# Patient Record
Sex: MALE | Race: WHITE | HISPANIC OR LATINO | Employment: PART TIME | URBAN - METROPOLITAN AREA
[De-identification: names, ages, dates, MRNs, and addresses within clinical notes are randomized per-mention and may not be internally consistent; named-entity substitution may affect disease eponyms.]

---

## 2023-03-09 PROBLEM — Z79.4 LONG-TERM INSULIN USE (MULTI): Status: ACTIVE | Noted: 2023-03-09

## 2023-03-09 PROBLEM — F41.8 DEPRESSION WITH ANXIETY: Status: ACTIVE | Noted: 2023-03-09

## 2023-03-09 PROBLEM — K86.2 PANCREATIC CYST (HHS-HCC): Status: ACTIVE | Noted: 2023-03-09

## 2023-03-09 PROBLEM — I10 HTN (HYPERTENSION): Status: ACTIVE | Noted: 2023-03-09

## 2023-03-09 PROBLEM — F41.9 ANXIETY: Status: ACTIVE | Noted: 2023-03-09

## 2023-03-09 PROBLEM — E11.9 TYPE 2 DIABETES MELLITUS (MULTI): Status: ACTIVE | Noted: 2023-03-09

## 2023-03-09 PROBLEM — K76.0 FATTY LIVER: Status: ACTIVE | Noted: 2023-03-09

## 2023-03-09 PROBLEM — R73.01 ELEVATED FASTING GLUCOSE: Status: ACTIVE | Noted: 2023-03-09

## 2023-03-09 PROBLEM — K52.9 CHRONIC DIARRHEA: Status: ACTIVE | Noted: 2023-03-09

## 2023-03-09 PROBLEM — L85.3 DRY SKIN DERMATITIS: Status: ACTIVE | Noted: 2023-03-09

## 2023-03-09 PROBLEM — K21.9 GERD (GASTROESOPHAGEAL REFLUX DISEASE): Status: ACTIVE | Noted: 2023-03-09

## 2023-03-09 PROBLEM — F90.1 ADHD, IMPULSIVE TYPE: Status: ACTIVE | Noted: 2023-03-09

## 2023-03-09 PROBLEM — F84.0 AUTISTIC SPECTRUM DISORDER (HHS-HCC): Status: ACTIVE | Noted: 2023-03-09

## 2023-03-09 PROBLEM — E66.9 OBESITY: Status: ACTIVE | Noted: 2023-03-09

## 2023-03-09 PROBLEM — G47.33 OSA (OBSTRUCTIVE SLEEP APNEA): Status: ACTIVE | Noted: 2023-03-09

## 2023-03-09 PROBLEM — E78.1 HYPERTRIGLYCERIDEMIA: Status: ACTIVE | Noted: 2023-03-09

## 2023-03-09 PROBLEM — J30.9 AR (ALLERGIC RHINITIS): Status: ACTIVE | Noted: 2023-03-09

## 2023-03-09 RX ORDER — FENOFIBRATE 160 MG/1
160 TABLET ORAL DAILY
COMMUNITY
End: 2023-04-28 | Stop reason: SDUPTHER

## 2023-03-09 RX ORDER — FLASH GLUCOSE SCANNING READER
EACH MISCELLANEOUS
COMMUNITY
End: 2024-02-08 | Stop reason: WASHOUT

## 2023-03-09 RX ORDER — BUPROPION HYDROCHLORIDE 300 MG/1
1 TABLET ORAL DAILY
COMMUNITY
Start: 2020-09-28 | End: 2023-08-29 | Stop reason: SDUPTHER

## 2023-03-09 RX ORDER — FLASH GLUCOSE SENSOR
KIT MISCELLANEOUS
COMMUNITY
End: 2024-02-08 | Stop reason: WASHOUT

## 2023-03-09 RX ORDER — ESCITALOPRAM OXALATE 20 MG/1
20 TABLET ORAL DAILY
COMMUNITY
Start: 2018-02-20 | End: 2023-04-28 | Stop reason: SDUPTHER

## 2023-03-09 RX ORDER — METOPROLOL TARTRATE 25 MG/1
25 TABLET, FILM COATED ORAL DAILY
COMMUNITY
End: 2023-04-28 | Stop reason: SDUPTHER

## 2023-03-09 RX ORDER — LANCETS
EACH MISCELLANEOUS
COMMUNITY
End: 2024-02-08 | Stop reason: WASHOUT

## 2023-03-09 RX ORDER — AMMONIUM LACTATE 12 G/100G
LOTION TOPICAL 2 TIMES DAILY
COMMUNITY
Start: 2022-09-06

## 2023-03-09 RX ORDER — METFORMIN HYDROCHLORIDE 500 MG/1
1 TABLET, EXTENDED RELEASE ORAL 2 TIMES DAILY
COMMUNITY
Start: 2021-08-16 | End: 2024-02-15 | Stop reason: WASHOUT

## 2023-03-09 RX ORDER — GLIMEPIRIDE 1 MG/1
1 TABLET ORAL 2 TIMES DAILY
COMMUNITY
Start: 2021-03-16 | End: 2024-02-08 | Stop reason: WASHOUT

## 2023-03-09 RX ORDER — BLOOD SUGAR DIAGNOSTIC
STRIP MISCELLANEOUS 4 TIMES DAILY
COMMUNITY
Start: 2021-02-18 | End: 2024-02-15 | Stop reason: WASHOUT

## 2023-03-09 RX ORDER — ATORVASTATIN CALCIUM 80 MG/1
1 TABLET, FILM COATED ORAL NIGHTLY
COMMUNITY
Start: 2021-01-29 | End: 2024-02-08 | Stop reason: WASHOUT

## 2023-03-20 ENCOUNTER — APPOINTMENT (OUTPATIENT)
Dept: PRIMARY CARE | Facility: CLINIC | Age: 28
End: 2023-03-20
Payer: MEDICAID

## 2023-04-28 DIAGNOSIS — E78.1 HYPERTRIGLYCERIDEMIA: ICD-10-CM

## 2023-04-28 DIAGNOSIS — F41.9 ANXIETY: ICD-10-CM

## 2023-04-28 DIAGNOSIS — I10 HYPERTENSION, UNSPECIFIED TYPE: Primary | ICD-10-CM

## 2023-04-28 RX ORDER — METOPROLOL TARTRATE 25 MG/1
25 TABLET, FILM COATED ORAL DAILY
Qty: 90 TABLET | Refills: 0 | Status: SHIPPED | OUTPATIENT
Start: 2023-04-28 | End: 2024-02-08

## 2023-04-28 RX ORDER — FENOFIBRATE 160 MG/1
160 TABLET ORAL DAILY
Qty: 90 TABLET | Refills: 0 | Status: SHIPPED | OUTPATIENT
Start: 2023-04-28 | End: 2023-08-29 | Stop reason: SDUPTHER

## 2023-04-28 RX ORDER — ESCITALOPRAM OXALATE 20 MG/1
20 TABLET ORAL DAILY
Qty: 90 TABLET | Refills: 0 | Status: SHIPPED | OUTPATIENT
Start: 2023-04-28 | End: 2024-02-08

## 2023-08-08 LAB
ALBUMIN (MG/L) IN URINE: 45.9 MG/L
ALBUMIN/CREATININE (UG/MG) IN URINE: 24.2 UG/MG CRT (ref 0–30)
ANION GAP IN SER/PLAS: 13 MMOL/L (ref 10–20)
CALCIUM (MG/DL) IN SER/PLAS: 9.7 MG/DL (ref 8.6–10.3)
CARBON DIOXIDE, TOTAL (MMOL/L) IN SER/PLAS: 26 MMOL/L (ref 21–32)
CHLORIDE (MMOL/L) IN SER/PLAS: 101 MMOL/L (ref 98–107)
CHOLESTEROL (MG/DL) IN SER/PLAS: 194 MG/DL (ref 0–199)
CHOLESTEROL IN HDL (MG/DL) IN SER/PLAS: 22.5 MG/DL
CHOLESTEROL/HDL RATIO: 8.6
CREATININE (MG/DL) IN SER/PLAS: 0.62 MG/DL (ref 0.5–1.3)
CREATININE (MG/DL) IN URINE: 190 MG/DL (ref 20–370)
ESTIMATED AVERAGE GLUCOSE FOR HBA1C: 137 MG/DL
GFR MALE: >90 ML/MIN/1.73M2
GLUCOSE (MG/DL) IN SER/PLAS: 130 MG/DL (ref 74–99)
HEMOGLOBIN A1C/HEMOGLOBIN TOTAL IN BLOOD: 6.4 %
LDL: ABNORMAL MG/DL (ref 0–99)
POTASSIUM (MMOL/L) IN SER/PLAS: 4.2 MMOL/L (ref 3.5–5.3)
SODIUM (MMOL/L) IN SER/PLAS: 136 MMOL/L (ref 136–145)
TRIGLYCERIDE (MG/DL) IN SER/PLAS: 1356 MG/DL (ref 0–149)
UREA NITROGEN (MG/DL) IN SER/PLAS: 9 MG/DL (ref 6–23)
VLDL: ABNORMAL MG/DL (ref 0–40)

## 2023-08-29 DIAGNOSIS — F41.9 ANXIETY: Primary | ICD-10-CM

## 2023-08-29 DIAGNOSIS — E78.1 HYPERTRIGLYCERIDEMIA: ICD-10-CM

## 2023-08-30 RX ORDER — BUPROPION HYDROCHLORIDE 300 MG/1
300 TABLET ORAL DAILY
Qty: 90 TABLET | Refills: 0 | Status: SHIPPED | OUTPATIENT
Start: 2023-08-30 | End: 2023-08-30

## 2023-08-30 RX ORDER — FENOFIBRATE 160 MG/1
160 TABLET ORAL DAILY
Qty: 90 TABLET | Refills: 0 | Status: SHIPPED | OUTPATIENT
Start: 2023-08-30 | End: 2023-08-30

## 2023-10-12 NOTE — PATIENT INSTRUCTIONS
[facility] Sleep Medicine  POR [address]  [facility]  [address]  [facility] [address]       NAME: Vineet Macario   DATE: 10/17/23    Your Sleep Provider Today: Teo Sharp APRN-CNP  Your Primary Care Physician: Naren Mari DO       DIAGNOSIS:   Sleep disorder breathing    Dear Vineet ALECIA Sorto,    Great to meet you today.  Thank you for visiting  Sleep Medicine Clinic !     1. We will proceed with a HOME sleep study. The lab will call you and schedule it for you.     2. Please do not drive when you are sleepy and  start practicing the sleep hygiene  as discussed in clinic today.     3.  We are going to do blood work to investigate your restless legs syndrome. Please fast 8 hours prior blood draw.    4. FOR QUESTIONS AND CONCERNS:   a) : To schedule, cancel, or reschedule SLEEP STUDY appointments, please call [phone]  b) : Please call my office with issues or questions: [phone] ([place]); [phone] ([facility])     If you have a CPAP or BiPAP machine at home, please bring the unit and all accessories including the power cord to your appointments unless I tell you otherwise. Please have knowledge of the DME company you worked with to receive your PAP device. If you have copies of any previous sleep testing completed outside of , please bring with you to clinic as well. This information will make our visits more productive.      If you are new to CPAP or BiPAP, please note the minimum usage insurance requires to continuing coverage for the equipment as noted by your DME company. Please discuss equipment issues (PAP unit, mask fit, humidification, etc.) with your DME company first.       In the event that you are running more than 10 minutes late to your appointment, I will kindly ask you to reschedule. Thanks.    RTC  2-3 weeks after sleep study        PATIENT EDUCATION     Obstructive Sleep Apnea (ELAINA) is a sleep disorder  where your upper airway muscles relax during sleep and the airway intermittently and repetitively narrows and collapses leading to partially blocked airway (hypopnea) or completely blocked airway (apnea) which, in turn, can disrupt breathing in sleep, lower oxygen levels while you sleep and cause night time wakings. Because both apnea and hypopnea may cause higher carbon dioxide or low oxygen levels, untreated ELAINA can lead to heart arrhythmia, elevation of blood pressure, and make it harder for the body to consolidate memory and facilitate metabolism (leading to higher blood sugars at night). Frequent partial arousals occur during sleep resulting in sleep deprivation and daytime sleepiness. ELAINA is associated with an increased risk of cardiovascular disease, stroke, hypertension, and insulin resistance. Moreover, untreated ELAINA with excessive daytime sleepiness can increase the risk of motor vehicular accidents.    Some conservative strategies for ELAINA regardless of ELAINA severity are:   Positional therapy - Avoid sleeping on your back.   Healthy diet and regular exercise to optimize weight is highly encouraged.   Avoid alcohol late in the evening and sedative-hypnotics as these substances can make sleep apnea worse.   Improve breathing through the nose with intranasal steroid spray, saline rinse, or antihistamines    Safety: Avoid driving vehicle and operating heavy equipment while sleepy. Drowsy driving may lead to life-threatening motor vehicle accidents. A person driving while sleepy is 5 times more likely to have an accident. If you feel sleepy, pull over and take a short power nap (sleep for less than 30 minutes). Otherwise, ask somebody to drive you.    Treatment options for sleep apnea include weight management, positional therapy, Positive Airway Therapy (PAP) therapy, oral appliance therapy, hypoglossal nerve stimulator (Inspire) and select airway surgeries.    You can also go to the following EDUCATION  WEBSITES for further information:   American Academy of Sleep Medicine http://sleepeducation.org  National Sleep Foundation: https://sleepfoundation.org  American Sleep Apnea Association: https://www.sleepapnea.org (for patients with sleep apnea)  Narcolepsy Network: https://www.narcolepsynetwork.org (for patients with narcolepsy)  [employer]: https://www.Chinese Onlinecolepsy.org (for patients with narcolepsy)  Hypersomnia Foundation: https://www.hypersomniafoundation.org (for patients with idiopathic hypersomnia)  RLS foundation: https://www.rls.org (for patients with restless leg syndrome)      IMPORTANT INFORMATION     Call 911 for medical emergencies.  Our offices are generally open from Monday-Friday, 9 am - 5 pm.  If you need to get in touch with me, you may either call me and my team(number is below) or you can use [employer].  If a referral for a test, for CPAP, or for another specialist was made, and you have not heard about scheduling this within a week, please call scheduling at [phone] ([phone]).  If you are unable to make your appointment for clinic or an overnight study, kindly call the office at least 48 hours in advance to cancel and reschedule.  If you are on CPAP, please bring your device's card or the device to each clinic appointment.   There are no supporting services by either the sleep doctors or their staff on weekends and Holidays, or after 5 PM on weekdays.   If you have been asked to come to a sleep study, make sure you bring toiletries, a comfy pillow, and any nighttime medications that you may regularly take. Also be sure to eat dinner before you arrive. We generally do not provide meals.      PRESCRIPTIONS     We require 7 days advanced notice for prescription refills. If we do not receive the request in this time, we cannot guarantee that your medication will be refilled in time.      IMPORTANT PHONE NUMBERS     Sleep Medicine Clinic Fax: [phone]  Appointments (for Pediatric  Sleep Clinic): [phone] - option 1  Appointments (for Adult Sleep Clinic): [phone] - option 2  Appointments (For Sleep Studies): [phone] - option 3  Behavioral Sleep Medicine: [phone]  Sleep Surgery: [phone]  ENT (Otolaryngology): [phone]  Headache Clinic (Neurology): [phone]  Neurology: [phone]  Psychiatry: [phone]  Pulmonary Function Testing (PFT) Center: [phone]  Pulmonary Medicine: [phone]  [employer] (DME): [phone]  [employer] (DME): [phone]  [facility] (DME): [phone]      OUR SLEEP TESTING LOCATIONS     Our team will contact you to schedule your sleep study, however, you can contact us as follow:  Main Phone Line (scheduling only): [phone]), option 3  Adult and Pediatric Locations  [facility] (6 years and older): [facility] - 4th floor ([facility]) After hours line: [phone]  [facility] (Main campus: All ages): [facility], 6th floor. After hours line: [phone]   [place] (5 years and older; younger considered on case-by-case basis): [address]; [address], [address]. Scheduling  After hours line: [phone]   [place] (6 years and older): [address]; [address]; [address]   [place] (6 years and older): [address], [address]  After hours line: [phone]   [place] (13 years and older) in [place]: [address], 2nd floor  After hours line: [phone]   [place] (13 year and older): [address]  After hours line: [phone]     Adult Only Locations:   [place] (18 years and older): [address] [facility], 2nd [address]  [facility] (18 years and older): [address] [facility]; 4th floor  After hours line: [phone]  [facility] (18 years and older) at [place]: [address] [facility]  After hours line: [phone]  "     CONTACTING YOUR SLEEP MEDICINE PROVIDER     Send a message directly to your provider through \"My Chart\", which is the email service through your  Records Account: [email or web address]   Call [phone] and leave a message. One of the administrative assistants will forward the message to your sleep medicine provider through \"My Chart\" and/or email.     Your sleep medicine provider for this visit was: NEAL Dhaliwal       "

## 2023-10-12 NOTE — PROGRESS NOTES
Patient: Vineet Sorto    [id number]  : 1995 -- AGE 28 y.o.    Provider: NEAL Dhaliwal     Location [facility]   Service Date: 10/17/23            [facility]  New Visit Note        HISTORY OF PRESENT ILLNESS     The patient's referring provider is: Kimberly Candelario MD     HISTORY OF PRESENT ILLNESS     Vineet Sorto is a 28 y.o. male who presents to a [facility] to establish care for his sleep disorder breathing. 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    The patient has a medical history of obesity, allergic rhinitis, hypertension, anxiety, depression, and sleep disorder breathing.    Current History    On today's visit, the patient reports having snoring, unfreshed sleep, and excessive daytime sleepiness at least 5-7 years. He did not have sleep study before.    Sleep Schedule: He goes to bed at 2-3 AM and his sleep latency is 15-30 minutes. He wakes by  11PM- MN. He has 2-3 awakenings for nocturia. He gets about 6-10 hours of sleep per night.      RLS Followup:   Meets 4/4 criteria, will do fasting blood work.      Daytime Symptoms    Patient report some daytime symptoms including: DAYTIME SYMPTOMS: excessive daytime sleepiness      Naps:   Yes. Naps ARE/ARENOT: are not refreshing.  Fatigue: moderate    ESS: 17  JORGE A 23        REVIEW OF SYSTEMS     REVIEW OF SYSTEMS: All other systems have been reviewed and are negative.     ALLERGIES AND MEDICATIONS   Active Problems, Allergy List, Medication List, and PMH/PSH/FH/Social Hx have been reviewed and reconciled in chart. No significant changes unless documented in the pertinent chart section. Updates made when necessary.      PHYSICAL EXAM     VITAL SIGNS:   Vitals:    10/17/23 1054   BP: 135/81   Pulse: 95   Resp: 16   Temp: 36.6 °C (97.9 °F)      PREVIOUS WEIGHTS:  Wt Readings from Last 3 Encounters:   23 (!) 161 kg (355 lb)  "  01/09/23 (!) 160 kg (352 lb)   10/19/22 (!) 160 kg (352 lb)       Physical Exam  PHYSICAL EXAM: GENERAL: alert pleasant and cooperative no acute distress  MODIFIED MALLAMPATI SCORE: III (soft and hard palate and base of uvula visible)  CHEST EXAM: breath sounds are clear to auscultation bilaterally without rales, rhonchi, or wheezes  CARDIAC EXAM: Heart sounds are normal.  Regular rate and rhythm without murmur, rubs, or gallop      RESULTS/DATA     No results found for: \"IRON\", \"TRANSFERRIN\", \"IRONSAT\", \"TIBC\", \"FERRITIN\"      ASSESSMENT/PLAN     Mr. Sorto is a 28 y.o. male and  has a past medical history of Other conditions influencing health status. He returns in followup to the [facility] for his sleep disorder breathing.    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 STOP-BANG:  Vineet Sorto has not yet had a sleep study.  -Instruct patient to complete home sleep study.  -HSAT is reasonable as patient likely has ELAINA based on history and exam and does not have any of the following comorbidities: CHF, neuromuscular weakness, hypoventilation, or significant COPD.  -We consider treatment as indicated when testing is complete.      # CHRONIC MAINTAIN INSOMNIA  -likely due to poor sleep hygiene, irregular sleep schedule, depression, anxiety, untreated sleep apnea, nocturia, RLS, and delayed sleep phase syndrome.  -JORGE A: 23  -Expose himself to bright light or daylight upon waking. "   -Avoid caffeine 8 hours prior to sleeping.      #  RESTLESS LEG SYNDROME:   -Last ferritin was not on file. We will check a ferritin level today and start OTC iron replacement to ferritin of >75 as indicated.  -Meet 4/4 RLS criteria.    # DEPRESSION and ANXIETY: Vineet Gudino taking bupropion 300 mg and escitalopram 20 mg to control well.  -Denies HI/SI     # HYPERTENSION:  -Blood pressure was 135/81 today.  -Denies headache, palpitation, and syncope in the clinic.  -Follows with PCP/ Cardiology     # OBESITY with a BMI of 44.94. Vineet ALSTON Eberwojciech most recent Bicarbonate was 26 in Aug,2023.         RTC 2-3 weeks after sleep study

## 2023-10-17 ENCOUNTER — OFFICE VISIT (OUTPATIENT)
Dept: SLEEP MEDICINE | Facility: CLINIC | Age: 28
End: 2023-10-17
Payer: MEDICAID

## 2023-10-17 VITALS
TEMPERATURE: 97.9 F | BODY MASS INDEX: 40.43 KG/M2 | DIASTOLIC BLOOD PRESSURE: 81 MMHG | WEIGHT: 315 LBS | RESPIRATION RATE: 16 BRPM | SYSTOLIC BLOOD PRESSURE: 135 MMHG | HEIGHT: 74 IN | HEART RATE: 95 BPM

## 2023-10-17 DIAGNOSIS — G47.30 SLEEP-RELATED BREATHING DISORDER: Primary | ICD-10-CM

## 2023-10-17 DIAGNOSIS — I10 HYPERTENSION, UNSPECIFIED TYPE: ICD-10-CM

## 2023-10-17 DIAGNOSIS — F41.8 DEPRESSION WITH ANXIETY: ICD-10-CM

## 2023-10-17 DIAGNOSIS — G47.00 INSOMNIA, UNSPECIFIED TYPE: ICD-10-CM

## 2023-10-17 DIAGNOSIS — E83.10 DISORDER OF IRON METABOLISM: ICD-10-CM

## 2023-10-17 PROCEDURE — 3044F HG A1C LEVEL LT 7.0%: CPT

## 2023-10-17 PROCEDURE — 3075F SYST BP GE 130 - 139MM HG: CPT

## 2023-10-17 PROCEDURE — 1036F TOBACCO NON-USER: CPT

## 2023-10-17 PROCEDURE — 99214 OFFICE O/P EST MOD 30 MIN: CPT

## 2023-10-17 PROCEDURE — 99204 OFFICE O/P NEW MOD 45 MIN: CPT

## 2023-10-17 PROCEDURE — 3079F DIAST BP 80-89 MM HG: CPT

## 2023-10-17 ASSESSMENT — SLEEP AND FATIGUE QUESTIONNAIRES
HOW LIKELY ARE YOU TO NOD OFF OR FALL ASLEEP WHEN YOU ARE A PASSENGER IN A CAR FOR AN HOUR WITHOUT A BREAK: MODERATE CHANCE OF DOZING
HOW LIKELY ARE YOU TO NOD OFF OR FALL ASLEEP WHILE SITTING QUIETLY AFTER LUNCH WITHOUT ALCOHOL: MODERATE CHANCE OF DOZING
HOW LIKELY ARE YOU TO NOD OFF OR FALL ASLEEP IN A CAR, WHILE STOPPED FOR A FEW MINUTES IN TRAFFIC: SLIGHT CHANCE OF DOZING
HOW LIKELY ARE YOU TO NOD OFF OR FALL ASLEEP WHILE WATCHING TV: HIGH CHANCE OF DOZING
ESS TOTAL SCORE: 17
HOW LIKELY ARE YOU TO NOD OFF OR FALL ASLEEP WHILE LYING DOWN TO REST IN THE AFTERNOON WHEN CIRCUMSTANCES PERMIT: HIGH CHANCE OF DOZING
ESS TOTAL SCORE: 17
SITING INACTIVE IN A PUBLIC PLACE LIKE A CLASS ROOM OR A MOVIE THEATER: MODERATE CHANCE OF DOZING
HOW LIKELY ARE YOU TO NOD OFF OR FALL ASLEEP WHILE SITTING AND TALKING TO SOMEONE: SLIGHT CHANCE OF DOZING
HOW LIKELY ARE YOU TO NOD OFF OR FALL ASLEEP WHILE SITTING AND READING: HIGH CHANCE OF DOZING

## 2023-11-24 ENCOUNTER — PHARMACY VISIT (OUTPATIENT)
Dept: PHARMACY | Facility: CLINIC | Age: 28
End: 2023-11-24
Payer: MEDICAID

## 2023-11-24 PROCEDURE — RXMED WILLOW AMBULATORY MEDICATION CHARGE

## 2023-12-26 ENCOUNTER — CLINICAL SUPPORT (OUTPATIENT)
Dept: SLEEP MEDICINE | Facility: HOSPITAL | Age: 28
End: 2023-12-26
Payer: MEDICAID

## 2023-12-26 DIAGNOSIS — G47.30 SLEEP-RELATED BREATHING DISORDER: ICD-10-CM

## 2023-12-26 PROCEDURE — 95806 SLEEP STUDY UNATT&RESP EFFT: CPT | Performed by: INTERNAL MEDICINE

## 2024-02-06 ENCOUNTER — PHARMACY VISIT (OUTPATIENT)
Dept: PHARMACY | Facility: CLINIC | Age: 29
End: 2024-02-06
Payer: MEDICAID

## 2024-02-06 PROCEDURE — RXMED WILLOW AMBULATORY MEDICATION CHARGE

## 2024-02-08 ENCOUNTER — LAB (OUTPATIENT)
Dept: LAB | Facility: LAB | Age: 29
End: 2024-02-08
Payer: MEDICAID

## 2024-02-08 ENCOUNTER — OFFICE VISIT (OUTPATIENT)
Dept: ENDOCRINOLOGY | Facility: CLINIC | Age: 29
End: 2024-02-08
Payer: MEDICAID

## 2024-02-08 VITALS
BODY MASS INDEX: 41.75 KG/M2 | HEART RATE: 87 BPM | HEIGHT: 73 IN | SYSTOLIC BLOOD PRESSURE: 132 MMHG | DIASTOLIC BLOOD PRESSURE: 66 MMHG | WEIGHT: 315 LBS

## 2024-02-08 DIAGNOSIS — E78.1 HYPERTRIGLYCERIDEMIA: ICD-10-CM

## 2024-02-08 DIAGNOSIS — E11.9 TYPE 2 DIABETES MELLITUS WITHOUT COMPLICATION, UNSPECIFIED WHETHER LONG TERM INSULIN USE (MULTI): Primary | ICD-10-CM

## 2024-02-08 DIAGNOSIS — E11.9 TYPE 2 DIABETES MELLITUS WITHOUT COMPLICATION, UNSPECIFIED WHETHER LONG TERM INSULIN USE (MULTI): ICD-10-CM

## 2024-02-08 LAB
ALBUMIN SERPL BCP-MCNC: 4.6 G/DL (ref 3.4–5)
ALP SERPL-CCNC: 42 U/L (ref 33–120)
ALT SERPL W P-5'-P-CCNC: 35 U/L (ref 10–52)
ANION GAP SERPL CALC-SCNC: 14 MMOL/L (ref 10–20)
AST SERPL W P-5'-P-CCNC: 19 U/L (ref 9–39)
BILIRUB SERPL-MCNC: 0.9 MG/DL (ref 0–1.2)
BUN SERPL-MCNC: 11 MG/DL (ref 6–23)
CALCIUM SERPL-MCNC: 9.6 MG/DL (ref 8.6–10.3)
CHLORIDE SERPL-SCNC: 103 MMOL/L (ref 98–107)
CHOLEST SERPL-MCNC: 154 MG/DL (ref 0–199)
CHOLESTEROL/HDL RATIO: 6.3
CO2 SERPL-SCNC: 24 MMOL/L (ref 21–32)
CREAT SERPL-MCNC: 0.54 MG/DL (ref 0.5–1.3)
CREAT UR-MCNC: 135.8 MG/DL (ref 20–370)
EGFRCR SERPLBLD CKD-EPI 2021: >90 ML/MIN/1.73M*2
EST. AVERAGE GLUCOSE BLD GHB EST-MCNC: 123 MG/DL
GLUCOSE SERPL-MCNC: 99 MG/DL (ref 74–99)
HBA1C MFR BLD: 5.9 %
HDLC SERPL-MCNC: 24.6 MG/DL
LDLC SERPL CALC-MCNC: ABNORMAL MG/DL
MICROALBUMIN UR-MCNC: 30.4 MG/L
MICROALBUMIN/CREAT UR: 22.4 UG/MG CREAT
NON HDL CHOLESTEROL: 129 MG/DL (ref 0–149)
POTASSIUM SERPL-SCNC: 4 MMOL/L (ref 3.5–5.3)
PROT SERPL-MCNC: 7.7 G/DL (ref 6.4–8.2)
SODIUM SERPL-SCNC: 137 MMOL/L (ref 136–145)
TRIGL SERPL-MCNC: 654 MG/DL (ref 0–149)
VLDL: ABNORMAL

## 2024-02-08 PROCEDURE — 99214 OFFICE O/P EST MOD 30 MIN: CPT | Performed by: INTERNAL MEDICINE

## 2024-02-08 PROCEDURE — 36415 COLL VENOUS BLD VENIPUNCTURE: CPT

## 2024-02-08 PROCEDURE — 3044F HG A1C LEVEL LT 7.0%: CPT | Performed by: INTERNAL MEDICINE

## 2024-02-08 PROCEDURE — 82043 UR ALBUMIN QUANTITATIVE: CPT

## 2024-02-08 PROCEDURE — 3060F POS MICROALBUMINURIA REV: CPT | Performed by: INTERNAL MEDICINE

## 2024-02-08 PROCEDURE — 80061 LIPID PANEL: CPT

## 2024-02-08 PROCEDURE — 1036F TOBACCO NON-USER: CPT | Performed by: INTERNAL MEDICINE

## 2024-02-08 PROCEDURE — 3078F DIAST BP <80 MM HG: CPT | Performed by: INTERNAL MEDICINE

## 2024-02-08 PROCEDURE — 80053 COMPREHEN METABOLIC PANEL: CPT

## 2024-02-08 PROCEDURE — 83036 HEMOGLOBIN GLYCOSYLATED A1C: CPT

## 2024-02-08 PROCEDURE — 3075F SYST BP GE 130 - 139MM HG: CPT | Performed by: INTERNAL MEDICINE

## 2024-02-08 PROCEDURE — 82570 ASSAY OF URINE CREATININE: CPT

## 2024-02-08 ASSESSMENT — ENCOUNTER SYMPTOMS
SHORTNESS OF BREATH: 0
VOMITING: 0
NAUSEA: 0
ABDOMINAL PAIN: 0

## 2024-02-08 NOTE — PROGRESS NOTES
"Stanley Sorto is a 28 y.o. male who presents for follow-up of Type 2 diabetes mellitus. The initial diagnosis of diabetes was made in January 2021 .     Since his last appointment, he has lost 17 pounds.  He is eating better with increased protein intake.    Known complications due to diabetes included none    Cardiovascular risk factors include diabetes mellitus, male gender, and obesity (BMI >= 30 kg/m2). The patient is not on an ACE inhibitor or angiotensin II receptor blocker.  The patient has been previously hospitalized due to diabetic ketoacidosis in January 2021 upon diagnosis.      Current symptoms/problems include none. His clinical course has been stable.     The patient is currently checking the blood glucose.    Patient is using: glucometer  Avearge 130-150mg/dL  Highest 180mg/dL after pizza  Lowest 110mg/dL     Hypoglycemia frequency: Denies   Hypoglycemia awareness: N/A      Current Medication Regimen  Metformin 500mg twice daily   Glimepiride 1mg twice daily      MEALS: wakes up at 10:30AM  Breakfast- protein shake   12L0pm - leftover chicken, soup,   Dinner 9:00pm- meat, vegetables, grain, potato, fiber   Snacks 5pm - granola bar, protein    Beverages - water, Gatorade zero, low caffeine teas, flavored water    Review of Systems   Respiratory:  Negative for shortness of breath.    Cardiovascular:  Negative for chest pain and leg swelling.   Gastrointestinal:  Negative for abdominal pain, nausea and vomiting.       Objective   /66 (BP Location: Right arm, Patient Position: Sitting, BP Cuff Size: Adult)   Pulse 87   Ht 1.854 m (6' 1\")   Wt (!) 152 kg (334 lb)   BMI 44.07 kg/m²   Physical Exam  Vitals and nursing note reviewed.   Constitutional:       General: He is not in acute distress.     Appearance: Normal appearance. He is obese.   HENT:      Head: Normocephalic and atraumatic.      Nose: Nose normal.      Mouth/Throat:      Mouth: Mucous membranes are moist.   Eyes:    "   Extraocular Movements: Extraocular movements intact.   Cardiovascular:      Rate and Rhythm: Normal rate and regular rhythm.   Pulmonary:      Effort: Pulmonary effort is normal.      Breath sounds: Normal breath sounds.   Musculoskeletal:         General: Normal range of motion.   Skin:     General: Skin is warm.   Neurological:      Mental Status: He is alert and oriented to person, place, and time.   Psychiatric:         Mood and Affect: Mood normal.       Lab Review  Glucose (mg/dL)   Date Value   08/08/2023 130 (H)   01/21/2023 122 (H)   07/16/2022 111 (H)     Hemoglobin A1C (%)   Date Value   08/08/2023 6.4 (A)   01/21/2023 6.0 (A)   07/16/2022 5.6     Bicarbonate (mmol/L)   Date Value   08/08/2023 26   01/21/2023 24   07/16/2022 25     Urea Nitrogen (mg/dL)   Date Value   08/08/2023 9   01/21/2023 9   07/16/2022 10     Creatinine (mg/dL)   Date Value   08/08/2023 0.62   01/21/2023 0.58   07/16/2022 0.58     Lab Results   Component Value Date    CHOL 154 02/08/2024    CHOL 194 08/08/2023    CHOL 125 01/21/2023     Lab Results   Component Value Date    HDL 24.6 02/08/2024    HDL 22.5 (A) 08/08/2023    HDL 19.8 (A) 01/21/2023     Lab Results   Component Value Date    LDLCALC  02/08/2024      Comment:      The calculation of LDL and VLDL are inaccurate when the Triglycerides are greater than 400 mg/dL or when the patient is non-fasting. If LDL measurement is necessary contact the testing laboratory for an alternative LDL assay.                                  Near   Borderline      AGE      Desirable  Optimal    High     High     Very High     0-19 Y     0 - 109     ---    110-129   >/= 130     ----    20-24 Y     0 - 119     ---    120-159   >/= 160     ----      >24 Y     0 -  99   100-129  130-159   160-189     >/=190       Lab Results   Component Value Date    TRIG 654 (H) 02/08/2024    TRIG 1,356 (H) 08/08/2023    TRIG 628 (H) 01/21/2023     Health Maintenance:   Foot Exam: 2023  Eye Exam:   January2023  Urine Albumin:    Assessment/Plan   28-year-old male presents for follow-up for type 2 diabetes. His blood pressure is at goal today.     Type 2 diabetes mellitus (CMS/[facility])  To continue Metformin 500mg twice daily   To continue Glimepiride 1mg twice daily before meals   Counseled to commence an exercise regimen to include 150 minutes of moderate intensity exercise per week  To obtain fasting blood and urine tests today   Please stick to a low carb/high protein diet   Counseled that the goal A1C should be 7% or less  Counseled glycemic control is warranted to prevent microvascular complications  Please check blood sugars 1-2 times daily and keep a detailed log      Hypertriglyceridemia  To continue Fenofibrate 160mg once daily   To continue Atorvastatin 80mg once daily   To obtain fasting blood test    For follow up in 6 months with glucose log

## 2024-02-08 NOTE — PATIENT INSTRUCTIONS
Thank you for choosing [facility] Endocrinology  for your health care needs.  If you have any questions, concerns or medical needs, please feel free to contact our office at [phone].    Please ensure you complete your blood work one week before the next scheduled appointment.     To continue Metformin 500mg twice daily   To continue Glimepiride 1mg twice daily before meals   To continue Fenofibrate 160mg once daily   To continue Atorvastatin 80mg once daily   Counseled to commence an exercise regimen to include 150 minutes of moderate intensity exercise per week  To obtain fasting blood and urine tests today   Please stick to a low carb diet   For follow up in 6 months with glucose log

## 2024-02-15 ENCOUNTER — TELEPHONE (OUTPATIENT)
Dept: ENDOCRINOLOGY | Facility: CLINIC | Age: 29
End: 2024-02-15
Payer: MEDICAID

## 2024-02-15 NOTE — RESULT ENCOUNTER NOTE
Labs have been reviewed.  THE A1C is 5.9%, which is exceptional.  The triglyceride value has improved to 654 (previously 1356).  Please continue cholesterol medications.  The urine, kidney and liver functions are normal.  For follow up as scheduled with repeat labs.

## 2024-02-15 NOTE — ASSESSMENT & PLAN NOTE
To continue Fenofibrate 160mg once daily   To continue Atorvastatin 80mg once daily   To obtain fasting blood test    For follow up in 6 months with glucose log

## 2024-02-15 NOTE — ASSESSMENT & PLAN NOTE
To continue Metformin 500mg twice daily   To continue Glimepiride 1mg twice daily before meals   Counseled to commence an exercise regimen to include 150 minutes of moderate intensity exercise per week  To obtain fasting blood and urine tests today   Please stick to a low carb/high protein diet   Counseled that the goal A1C should be 7% or less  Counseled glycemic control is warranted to prevent microvascular complications  Please check blood sugars 1-2 times daily and keep a detailed log

## 2024-02-15 NOTE — TELEPHONE ENCOUNTER
----- Message from Kimberly Candelario MD sent at 2/15/2024  5:33 AM EST -----  Labs have been reviewed.  THE A1C is 5.9%, which is exceptional.  The triglyceride value has improved to 654 (previously 1356).  Please continue cholesterol medications.  The urine, kidney and liver functions are normal.  For follow up as scheduled with repeat labs.

## 2024-03-05 ENCOUNTER — TELEPHONE (OUTPATIENT)
Dept: ENDOCRINOLOGY | Facility: CLINIC | Age: 29
End: 2024-03-05
Payer: MEDICAID

## 2024-05-02 PROCEDURE — RXMED WILLOW AMBULATORY MEDICATION CHARGE

## 2024-05-06 ENCOUNTER — PHARMACY VISIT (OUTPATIENT)
Dept: PHARMACY | Facility: CLINIC | Age: 29
End: 2024-05-06
Payer: COMMERCIAL

## 2024-07-29 ENCOUNTER — PHARMACY VISIT (OUTPATIENT)
Dept: PHARMACY | Facility: CLINIC | Age: 29
End: 2024-07-29
Payer: COMMERCIAL

## 2024-07-29 PROCEDURE — RXMED WILLOW AMBULATORY MEDICATION CHARGE

## 2024-08-12 ENCOUNTER — APPOINTMENT (OUTPATIENT)
Dept: ENDOCRINOLOGY | Facility: CLINIC | Age: 29
End: 2024-08-12
Payer: COMMERCIAL

## 2024-10-14 DIAGNOSIS — E78.1 HYPERTRIGLYCERIDEMIA: Primary | ICD-10-CM

## 2024-10-14 PROCEDURE — RXMED WILLOW AMBULATORY MEDICATION CHARGE

## 2024-10-14 RX ORDER — OMEGA-3-ACID ETHYL ESTERS 1 G/1
2 CAPSULE, LIQUID FILLED ORAL
Qty: 120 CAPSULE | Refills: 6 | Status: SHIPPED | OUTPATIENT
Start: 2024-10-14 | End: 2025-10-14

## 2024-10-14 NOTE — TELEPHONE ENCOUNTER
Next appt 10/21/2024    Patient need refills on omega 3 to be sent to [facility]

## 2024-10-20 NOTE — PROGRESS NOTES
"Stanley Sorto is a 29 y.o. male who presents for follow-up of Type 2 diabetes mellitus. The initial diagnosis of diabetes was made in January 2021 .     He has had no major changes to his health since his last appointment.    Known complications due to diabetes included obesity    Cardiovascular risk factors include diabetes mellitus, male gender, and obesity (BMI >= 30 kg/m2). The patient is not on an ACE inhibitor or angiotensin II receptor blocker.  The patient has been previously hospitalized due to diabetic ketoacidosis in January 2021 upon diagnosis.      Current symptoms/problems include none. His clinical course has been stable.     The patient is currently checking the blood glucose.    Patient is using: glucometer    Hypoglycemia frequency: Denies   Hypoglycemia awareness: N/A      Current Medication Regimen  Metformin 500mg twice daily   Glimepiride 1mg twice daily      MEALS: wakes up at 12pm  12:30pm - leftovers   Dinner 8:30pm- meat, vegetables, grain, salads   Snacks - protein shake  Beverages - water, protein shakes    Exercisie - waking more     Review of Systems   Respiratory:  Negative for shortness of breath.    Cardiovascular:  Negative for chest pain and leg swelling.   Gastrointestinal:  Negative for abdominal pain.   All other systems reviewed and are negative.      Objective   /70 (BP Location: Left arm, Patient Position: Sitting, BP Cuff Size: Adult)   Pulse 82   Ht 1.88 m (6' 2\")   Wt (!) 152 kg (336 lb)   BMI 43.14 kg/m²   Physical Exam  Vitals and nursing note reviewed.   Constitutional:       General: He is not in acute distress.     Appearance: Normal appearance. He is obese.   HENT:      Head: Normocephalic and atraumatic.      Nose: Nose normal.      Mouth/Throat:      Mouth: Mucous membranes are moist.   Eyes:      Extraocular Movements: Extraocular movements intact.   Cardiovascular:      Rate and Rhythm: Normal rate and regular rhythm.   Pulmonary:      " Effort: Pulmonary effort is normal.      Breath sounds: Normal breath sounds.   Musculoskeletal:         General: Normal range of motion.   Skin:     General: Skin is warm.   Neurological:      Mental Status: He is alert and oriented to person, place, and time.   Psychiatric:         Mood and Affect: Mood normal.       Lab Review  Glucose (mg/dL)   Date Value   02/08/2024 99   08/08/2023 130 (H)   01/21/2023 122 (H)   07/16/2022 111 (H)     Hemoglobin A1C (%)   Date Value   02/08/2024 5.9 (H)   08/08/2023 6.4 (A)   01/21/2023 6.0 (A)   07/16/2022 5.6     Bicarbonate (mmol/L)   Date Value   02/08/2024 24   08/08/2023 26   01/21/2023 24   07/16/2022 25     Urea Nitrogen (mg/dL)   Date Value   02/08/2024 11   08/08/2023 9   01/21/2023 9   07/16/2022 10     Creatinine (mg/dL)   Date Value   02/08/2024 0.54   08/08/2023 0.62   01/21/2023 0.58   07/16/2022 0.58     Lab Results   Component Value Date    CHOL 154 02/08/2024    CHOL 194 08/08/2023    CHOL 125 01/21/2023     Lab Results   Component Value Date    HDL 24.6 02/08/2024    HDL 22.5 (A) 08/08/2023    HDL 19.8 (A) 01/21/2023     Lab Results   Component Value Date    LDLCALC  02/08/2024      Comment:      The calculation of LDL and VLDL are inaccurate when the Triglycerides are greater than 400 mg/dL or when the patient is non-fasting. If LDL measurement is necessary contact the testing laboratory for an alternative LDL assay.                                  Near   Borderline      AGE      Desirable  Optimal    High     High     Very High     0-19 Y     0 - 109     ---    110-129   >/= 130     ----    20-24 Y     0 - 119     ---    120-159   >/= 160     ----      >24 Y     0 -  99   100-129  130-159   160-189     >/=190       Lab Results   Component Value Date    TRIG 654 (H) 02/08/2024    TRIG 1,356 (H) 08/08/2023    TRIG 628 (H) 01/21/2023     Health Maintenance:   Foot Exam: 2023  Eye Exam:  January2023  Urine Albumin:  February 8, 2024    Assessment/Plan    29-year-old male presents for follow-up for type 2 diabetes and hypertriglyceridemia. His blood pressure is at goal.    Type 2 diabetes mellitus (Multi)  To continue Metformin 500mg twice daily   To continue Glimepiride 1mg twice daily before meals   To obtain labs as ordered in April   Please stick to a low carb diet       Hypertriglyceridemia  To continue Fenofibrate 160mg once daily   To continue Atorvastatin 80mg once daily   To restart Lovaza 2g twice daily (this was sent on 10/14)  To obtain lipid panel    For follow up in 6 months

## 2024-10-20 NOTE — PATIENT INSTRUCTIONS
Thank you for choosing [facility]  for your health care needs.  If you have any questions, concerns or medical needs, please feel free to contact our office at [phone].    Please ensure you complete your blood work one week before the next scheduled appointment.     To continue Metformin 500mg twice daily   To continue Glimepiride 1mg twice daily before meals   To continue Fenofibrate 160mg once daily   To continue Atorvastatin 80mg once daily   To restart Lovaza 2g twice daily (this was sent on 10/14)  To obtain labs as ordered in April   Please stick to a low carb diet   For follow up in 6 months with glucose log

## 2024-10-21 ENCOUNTER — APPOINTMENT (OUTPATIENT)
Dept: ENDOCRINOLOGY | Facility: CLINIC | Age: 29
End: 2024-10-21
Payer: COMMERCIAL

## 2024-10-21 VITALS
DIASTOLIC BLOOD PRESSURE: 70 MMHG | HEIGHT: 74 IN | WEIGHT: 315 LBS | SYSTOLIC BLOOD PRESSURE: 110 MMHG | HEART RATE: 82 BPM | BODY MASS INDEX: 40.43 KG/M2

## 2024-10-21 DIAGNOSIS — E78.1 HYPERTRIGLYCERIDEMIA: ICD-10-CM

## 2024-10-21 DIAGNOSIS — E11.9 TYPE 2 DIABETES MELLITUS WITHOUT COMPLICATION, UNSPECIFIED WHETHER LONG TERM INSULIN USE (MULTI): Primary | ICD-10-CM

## 2024-10-21 LAB — POC FINGERSTICK BLOOD GLUCOSE: 121 MG/DL (ref 70–100)

## 2024-10-21 PROCEDURE — 3060F POS MICROALBUMINURIA REV: CPT | Performed by: INTERNAL MEDICINE

## 2024-10-21 PROCEDURE — 82962 GLUCOSE BLOOD TEST: CPT | Performed by: INTERNAL MEDICINE

## 2024-10-21 PROCEDURE — 99214 OFFICE O/P EST MOD 30 MIN: CPT | Performed by: INTERNAL MEDICINE

## 2024-10-21 PROCEDURE — 3044F HG A1C LEVEL LT 7.0%: CPT | Performed by: INTERNAL MEDICINE

## 2024-10-21 PROCEDURE — 3074F SYST BP LT 130 MM HG: CPT | Performed by: INTERNAL MEDICINE

## 2024-10-21 PROCEDURE — 3078F DIAST BP <80 MM HG: CPT | Performed by: INTERNAL MEDICINE

## 2024-10-21 PROCEDURE — 3008F BODY MASS INDEX DOCD: CPT | Performed by: INTERNAL MEDICINE

## 2024-10-21 PROCEDURE — RXMED WILLOW AMBULATORY MEDICATION CHARGE

## 2024-10-21 RX ORDER — GLIMEPIRIDE 1 MG/1
1 TABLET ORAL 2 TIMES DAILY
Qty: 180 TABLET | Refills: 2 | Status: SHIPPED | OUTPATIENT
Start: 2024-10-21 | End: 2025-10-21

## 2024-10-21 RX ORDER — ATORVASTATIN CALCIUM 80 MG/1
80 TABLET, FILM COATED ORAL NIGHTLY
Qty: 90 TABLET | Refills: 2 | Status: SHIPPED | OUTPATIENT
Start: 2024-10-21 | End: 2025-10-21

## 2024-10-21 RX ORDER — FENOFIBRATE 160 MG/1
160 TABLET ORAL NIGHTLY
Qty: 90 TABLET | Refills: 0 | Status: SHIPPED | OUTPATIENT
Start: 2024-10-21 | End: 2025-10-21

## 2024-10-21 RX ORDER — METFORMIN HYDROCHLORIDE 500 MG/1
500 TABLET, EXTENDED RELEASE ORAL 2 TIMES DAILY
Qty: 180 TABLET | Refills: 2 | Status: SHIPPED | OUTPATIENT
Start: 2024-10-21 | End: 2025-10-21

## 2024-10-21 ASSESSMENT — ENCOUNTER SYMPTOMS
ABDOMINAL PAIN: 0
SHORTNESS OF BREATH: 0

## 2024-10-22 ENCOUNTER — PHARMACY VISIT (OUTPATIENT)
Dept: PHARMACY | Facility: CLINIC | Age: 29
End: 2024-10-22
Payer: COMMERCIAL

## 2024-10-22 NOTE — ASSESSMENT & PLAN NOTE
To continue Metformin 500mg twice daily   To continue Glimepiride 1mg twice daily before meals   To obtain labs as ordered in April   Please stick to a low carb diet

## 2024-10-22 NOTE — ASSESSMENT & PLAN NOTE
To continue Fenofibrate 160mg once daily   To continue Atorvastatin 80mg once daily   To restart Lovaza 2g twice daily (this was sent on 10/14)  To obtain lipid panel    For follow up in 6 months

## 2025-01-22 ENCOUNTER — PHARMACY VISIT (OUTPATIENT)
Dept: PHARMACY | Facility: CLINIC | Age: 30
End: 2025-01-22
Payer: COMMERCIAL

## 2025-01-22 PROCEDURE — RXMED WILLOW AMBULATORY MEDICATION CHARGE

## 2025-04-07 ENCOUNTER — PHARMACY VISIT (OUTPATIENT)
Dept: PHARMACY | Facility: CLINIC | Age: 30
End: 2025-04-07
Payer: COMMERCIAL

## 2025-04-07 PROCEDURE — RXMED WILLOW AMBULATORY MEDICATION CHARGE

## 2025-04-28 ENCOUNTER — APPOINTMENT (OUTPATIENT)
Dept: ENDOCRINOLOGY | Facility: CLINIC | Age: 30
End: 2025-04-28
Payer: COMMERCIAL

## 2025-04-28 VITALS
BODY MASS INDEX: 40.43 KG/M2 | SYSTOLIC BLOOD PRESSURE: 130 MMHG | HEIGHT: 74 IN | HEART RATE: 89 BPM | WEIGHT: 315 LBS | DIASTOLIC BLOOD PRESSURE: 76 MMHG

## 2025-04-28 DIAGNOSIS — E11.9 TYPE 2 DIABETES MELLITUS WITHOUT COMPLICATION, UNSPECIFIED WHETHER LONG TERM INSULIN USE: Primary | ICD-10-CM

## 2025-04-28 DIAGNOSIS — E78.1 HYPERTRIGLYCERIDEMIA: ICD-10-CM

## 2025-04-28 PROCEDURE — 3008F BODY MASS INDEX DOCD: CPT | Performed by: INTERNAL MEDICINE

## 2025-04-28 PROCEDURE — 3075F SYST BP GE 130 - 139MM HG: CPT | Performed by: INTERNAL MEDICINE

## 2025-04-28 PROCEDURE — 99214 OFFICE O/P EST MOD 30 MIN: CPT | Performed by: INTERNAL MEDICINE

## 2025-04-28 PROCEDURE — 3078F DIAST BP <80 MM HG: CPT | Performed by: INTERNAL MEDICINE

## 2025-04-28 ASSESSMENT — ENCOUNTER SYMPTOMS: WEAKNESS: 0

## 2025-04-28 NOTE — PATIENT INSTRUCTIONS
Thank you for choosing [facility]  for your health care needs.  If you have any questions, concerns or medical needs, please feel free to contact our office at [phone].    Please ensure you complete your blood work one week before the next scheduled appointment.     To continue Metformin 500mg twice daily   To continue Glimepiride 1mg twice daily before meals   To continue Fenofibrate 160mg once daily   To continue Atorvastatin 80mg once daily   To continue Lovaza 2g twice daily   To obtain fasting labs  Please stick to a low carb diet   For follow up in 6 months with glucose log

## 2025-04-28 NOTE — PROGRESS NOTES
"Stanley Sorto is a 29 y.o. male who presents for follow-up of Type 2 diabetes mellitus. The initial diagnosis of diabetes was made in January 2021.     He has had no major changes to his health since his last appointment.   He has been using a multivitamin which has lead to less fatigue.      Known complications due to diabetes included obesity    Cardiovascular risk factors include diabetes mellitus, male gender, and obesity (BMI >= 30 kg/m2). The patient is not on an ACE inhibitor or angiotensin II receptor blocker.  The patient has been previously hospitalized due to diabetic ketoacidosis in January 2021 upon diagnosis.      Current symptoms/problems include none. His clinical course has been stable.     The patient is currently checking the blood glucose.    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   All other systems reviewed and are negative.      Objective   /76   Pulse 89   Ht 1.88 m (6' 2\")   Wt 149 kg (328 lb 12.8 oz)   BMI 42.22 kg/m²   Physical Exam  Vitals and nursing note reviewed.   Constitutional:       General: He is not in acute distress.     Appearance: Normal appearance. He is obese.   HENT:      Head: Normocephalic and atraumatic.      Nose: Nose normal.      Mouth/Throat:      Mouth: Mucous membranes are moist.   Eyes:      Extraocular Movements: Extraocular movements intact.   Cardiovascular:      Rate and Rhythm: Normal rate and regular rhythm.   Pulmonary:      Effort: Pulmonary effort is normal.      Breath sounds: Normal " breath sounds.   Musculoskeletal:         General: Normal range of motion.   Skin:     General: Skin is warm.   Neurological:      Mental Status: He is alert and oriented to person, place, and time.   Psychiatric:         Mood and Affect: Mood normal.       Lab Review  Glucose (mg/dL)   Date Value   02/08/2024 99   08/08/2023 130 (H)   01/21/2023 122 (H)   07/16/2022 111 (H)     Hemoglobin A1C (%)   Date Value   02/08/2024 5.9 (H)   08/08/2023 6.4 (A)   01/21/2023 6.0 (A)   07/16/2022 5.6     Bicarbonate (mmol/L)   Date Value   02/08/2024 24   08/08/2023 26   01/21/2023 24   07/16/2022 25     Urea Nitrogen (mg/dL)   Date Value   02/08/2024 11   08/08/2023 9   01/21/2023 9   07/16/2022 10     Creatinine (mg/dL)   Date Value   02/08/2024 0.54   08/08/2023 0.62   01/21/2023 0.58   07/16/2022 0.58     Lab Results   Component Value Date    CHOL 154 02/08/2024    CHOL 194 08/08/2023    CHOL 125 01/21/2023     Lab Results   Component Value Date    HDL 24.6 02/08/2024    HDL 22.5 (A) 08/08/2023    HDL 19.8 (A) 01/21/2023     Lab Results   Component Value Date    LDLCALC  02/08/2024      Comment:      The calculation of LDL and VLDL are inaccurate when the Triglycerides are greater than 400 mg/dL or when the patient is non-fasting. If LDL measurement is necessary contact the testing laboratory for an alternative LDL assay.                                  Near   Borderline      AGE      Desirable  Optimal    High     High     Very High     0-19 Y     0 - 109     ---    110-129   >/= 130     ----    20-24 Y     0 - 119     ---    120-159   >/= 160     ----      >24 Y     0 -  99   100-129  130-159   160-189     >/=190       Lab Results   Component Value Date    TRIG 654 (H) 02/08/2024    TRIG 1,356 (H) 08/08/2023    TRIG 628 (H) 01/21/2023     Health Maintenance:   Foot Exam: 2023  Eye Exam:  January2023  Urine Albumin:  February 8, 2024    Assessment/Plan   29-year-old male presents for follow-up for type 2 diabetes and  hypertriglyceridemia. His blood pressure is at goal.     Type 2 diabetes mellitus (Multi)  To continue Metformin 500mg twice daily   To continue Glimepiride 1mg twice daily before meals   To obtain fasting labs  Please stick to a low carb diet       Hypertriglyceridemia  To continue Fenofibrate 160mg once daily   To continue Atorvastatin 80mg once daily   To continue Lovaza 2g twice daily   To obtain a lipid panel     For follow up in 6 months

## 2025-05-02 RX ORDER — METFORMIN HYDROCHLORIDE 500 MG/1
500 TABLET, EXTENDED RELEASE ORAL 2 TIMES DAILY
Qty: 180 TABLET | Refills: 2 | Status: SHIPPED | OUTPATIENT
Start: 2025-05-02 | End: 2026-05-02

## 2025-05-02 RX ORDER — GLIMEPIRIDE 1 MG/1
1 TABLET ORAL 2 TIMES DAILY
Qty: 180 TABLET | Refills: 2 | Status: SHIPPED | OUTPATIENT
Start: 2025-05-02 | End: 2026-05-02

## 2025-05-02 RX ORDER — FENOFIBRATE 160 MG/1
160 TABLET ORAL NIGHTLY
Qty: 90 TABLET | Refills: 0 | Status: SHIPPED | OUTPATIENT
Start: 2025-05-02 | End: 2026-05-02

## 2025-05-02 RX ORDER — OMEGA-3-ACID ETHYL ESTERS 1 G/1
2 CAPSULE, LIQUID FILLED ORAL
Qty: 120 CAPSULE | Refills: 6 | Status: SHIPPED | OUTPATIENT
Start: 2025-05-02 | End: 2026-05-02

## 2025-05-02 RX ORDER — ATORVASTATIN CALCIUM 80 MG/1
80 TABLET, FILM COATED ORAL NIGHTLY
Qty: 90 TABLET | Refills: 2 | Status: SHIPPED | OUTPATIENT
Start: 2025-05-02 | End: 2026-05-02

## 2025-05-02 NOTE — ASSESSMENT & PLAN NOTE
To continue Metformin 500mg twice daily   To continue Glimepiride 1mg twice daily before meals   To obtain fasting labs  Please stick to a low carb diet

## 2025-05-02 NOTE — ASSESSMENT & PLAN NOTE
To continue Fenofibrate 160mg once daily   To continue Atorvastatin 80mg once daily   To continue Lovaza 2g twice daily   To obtain a lipid panel     For follow up in 6 months

## 2025-06-05 PROCEDURE — RXMED WILLOW AMBULATORY MEDICATION CHARGE

## 2025-06-06 ENCOUNTER — PHARMACY VISIT (OUTPATIENT)
Dept: PHARMACY | Facility: CLINIC | Age: 30
End: 2025-06-06
Payer: COMMERCIAL

## 2025-06-13 ENCOUNTER — HOSPITAL ENCOUNTER (EMERGENCY)
Facility: HOSPITAL | Age: 30
Discharge: HOME | End: 2025-06-13
Payer: COMMERCIAL

## 2025-06-13 VITALS
HEART RATE: 92 BPM | OXYGEN SATURATION: 100 % | DIASTOLIC BLOOD PRESSURE: 87 MMHG | BODY MASS INDEX: 40.43 KG/M2 | HEIGHT: 74 IN | WEIGHT: 315 LBS | TEMPERATURE: 98.1 F | RESPIRATION RATE: 16 BRPM | SYSTOLIC BLOOD PRESSURE: 139 MMHG
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
## 2025-06-13 PROCEDURE — 36415 COLL VENOUS BLD VENIPUNCTURE: CPT | Performed by: PHYSICIAN ASSISTANT

## 2025-06-13 PROCEDURE — 99282 EMERGENCY DEPT VISIT SF MDM: CPT

## 2025-06-13 PROCEDURE — 99281 EMR DPT VST MAYX REQ PHY/QHP: CPT

## 2025-06-13 ASSESSMENT — PAIN SCALES - GENERAL: PAINLEVEL_OUTOF10: 0 - NO PAIN

## 2025-06-13 ASSESSMENT — PAIN - FUNCTIONAL ASSESSMENT: PAIN_FUNCTIONAL_ASSESSMENT: 0-10

## 2025-06-13 NOTE — DISCHARGE INSTRUCTIONS
Please follow-up with your primary care provider 1 to 2 days, or return to the emergency department immediately with any worsening symptoms.    If any medications were prescribed please take them as instructed.    Please follow-up with occupational health as information provided.

## 2025-06-13 NOTE — ED TRIAGE NOTES
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  He assumed the needle was used, he has picture of it.

## 2025-06-14 NOTE — ED PROVIDER NOTES
EMERGENCY MEDICINE EVALUATION NOTE    History of Present Illness     Chief Complaint:   Chief Complaint   Patient presents with    Needle stick-- employee     Right hand       HPI: Vineet Sorto is a 29 y.o. male presents with a chief complaint of needlestick injury.  Patient reports that it occurred to his right hand.  He states that occurred in the webbing between his right index and thumb.  He states he cannot see where the injury occurred now currently.  He states that he was up on the floor and it was a Angel needle.  He states that he was just taking trash out when he stuck his hand into the bag and felt a poke.  Patient is unsure of the source patient.  Patient denies any current bleeding.  He states he is up-to-date on immunizations.    Previous History   Medical History[1]  Surgical History[2]  Social History[3]  Family History[4]  Allergies[5]  Current Outpatient Medications   Medication Instructions    ammonium lactate (Lac-Hydrin) 12 % lotion 2 times daily    atorvastatin (Lipitor) 80 mg tablet TAKE 1 TABLET BY MOUTH AT BEDTIME    buPROPion XL (Wellbutrin XL) 300 mg 24 hr tablet TAKE 1 TABLET BY MOUTH DAILY    escitalopram (LEXAPRO) 20 mg, oral, Daily    fenofibrate (Triglide) 160 mg tablet TAKE 1 TABLET BY MOUTH AT BEDTIME    glimepiride (Amaryl) 1 mg tablet TAKE 1 TABLET BY MOUTH TWO TIMES A DAY    metFORMIN XR (Glucophage-XR) 500 mg 24 hr tablet TAKE 1 TABLET BY MOUTH TWO TIMES A DAY    metoprolol tartrate (LOPRESSOR) 25 mg, oral, Daily    multivit-minerals/folic acid (MULTIVITAMIN GUMMIES ORAL) Take by mouth.    omega-3 acid ethyl esters (Lovaza) 1 gram capsule TAKE 2 CAPSULES BY MOUTH TWO TIMES A DAY       Physical Exam     Appearance: Alert, oriented , cooperative,  in no acute distress.      Skin: Intact,  dry skin, no lesions, rash, petechiae or purpura.  Skin appears to be intact on the right hand there is no obvious puncture wound or deformity present.  No bleeding noted.     Eyes:  "PERRLA, EOMs intact,  Conjunctiva pink with no redness or exudates.      ENT: Hearing grossly intact. Pharynx clear, uvula midline.      Neck: Supple. Trachea at midline.      Pulmonary: Clear bilaterally. No rales, rhonchi or wheezing. No accessory muscle use or stridor.     Cardiac: Normal rate and rhythm without murmur     Abdomen: Soft, nontender, active bowel sounds.     Musculoskeletal: Full range of motion.      Neurological:Cranial nerves II through XII are grossly intact, normal sensation, no weakness, no focal findings identified.     Results   Labs Reviewed - No data to display  No orders to display         ED Course & Medical Decision Making   Medications - No data to display  Heart Rate:  [92]   Temperature:  [36.7 °C (98.1 °F)]   Respirations:  [16]   BP: (139)/(87)   Height:  [188 cm (6' 2\")]   Weight:  [147 kg (325 lb)]   Pulse Ox:  [100 %]    ED Course as of 06/13/25 2046 Fri Jun 13, 2025 1952 Plan of care was discussed with the patient.  Since we do not know the actual true source patient for the needlestick patient will have blood work drawn here.  Patient will have the blood work drawn for the \"bloodborne pathogen exposure checklist.\"  At this time patient is declining starting any medication.  I discussed with him that the transmission rate from a hypodermic needle is very low so he is agreeable to not starting any of the PEP and waiting to see what the blood work results are to see if he would need any treatment.  Patient will be discharged at this time to follow-up with employee health/occupational health for follow-up evaluation to go over his blood work.  Patient was encouraged return here immediately with any worsening symptoms or signs of infection. [CJ]      ED Course User Index  [CJ] Ej Villavicencio PA-C         Diagnoses as of 06/13/25 2046   Accident caused by hypodermic needle, initial encounter       Procedures   Procedures    Diagnosis     1. Accident caused by hypodermic needle, " initial encounter        Disposition   Discharge    ED Prescriptions    None         Disclaimer: This note was dictated by speech recognition. Minor errors in transcription may be present. Please call if questions.         [1]   Past Medical History:  Diagnosis Date    Other conditions influencing health status     Seasonal allergic rhinitis, unspecified chronicity, unspecified trigger   [2] No past surgical history on file.  [3]   Social History  Tobacco Use    Smoking status: Never    Smokeless tobacco: Never   Substance Use Topics    Alcohol use: Not Currently     Comment: socially    Drug use: Never   [4]   Family History  Problem Relation Name Age of Onset    No Known Problems Mother      No Known Problems Father      Diabetes type II Other UNCLE     Diabetes type II Cousin     [5] No Known Allergies       Ej Villavicencio PA-C  06/13/25 [id number]

## 2025-08-25 ENCOUNTER — PHARMACY VISIT (OUTPATIENT)
Dept: PHARMACY | Facility: CLINIC | Age: 30
End: 2025-08-25
Payer: COMMERCIAL

## 2025-08-25 PROCEDURE — RXMED WILLOW AMBULATORY MEDICATION CHARGE

## 2025-11-03 ENCOUNTER — APPOINTMENT (OUTPATIENT)
Dept: ENDOCRINOLOGY | Facility: CLINIC | Age: 30
End: 2025-11-03
Payer: COMMERCIAL